# Patient Record
(demographics unavailable — no encounter records)

---

## 2024-10-30 NOTE — HISTORY OF PRESENT ILLNESS
[FreeTextEntry1] : THIS PLEASANT PATIENT IS 68 year OLD  male  WHO PRESENT TODAY IN OFFICE WITH LSPINE RADATING TO THE RT HIP DWN TO THE KNEE    DATE OF INJURY/ONSET  THE PAST 6 MTH      PAIN LEVEL: 7/10     MECHANISM OF INJURY: UNKOWN INJURAY       QUALITY OF SYMPTOMS:  DULL/ACHE, SHOOTING      IMPROVES WITH:  NOTHING     WORSE WITH:  GAIT, BALANCE      PRIOR TREATMENT:  AS OF 05/10/2024 PATIENT IS PRESENTING WITH ACUTE/SUB-ACUTE RADICULAR PAIN WITH IMPAIRMENT IN ADLs AND FUNCTIONALITY. THE PATIENT HAS NOT RESPONDED TO CONSERVATIVE CARE INCLUDING NSAID THERAPY AND/OR PHYSICAL THERAPY 2-3X A WEEK FOR 6 WEEK      PRIOR IMAGING:  NO RECENT IMAGED        SCHOOL/SPORT/POSITION/OCCUPATION:       ADDITIONAL INFORMATION

## 2024-10-30 NOTE — ASSESSMENT
[FreeTextEntry1] : Subjective findings This 68-year-old male presents with pain in the back with a minimal radiating component down the leg and the pain is all on the right side.  Patient's history is that he underwent a knee repair on his left side and after that started developing this pain.  He has had sciatica in the past has been treated with epidural steroid injections but he states that this pain feels different.  He denies any bowel or bladder incontinence any progressive weakness but presents to us for our evaluation for possible interventional methods to treat this pain.The CV/Pulm/GI/Heme/Renal/Hepatic//Psych/Endo systems are reviewed. A full ROS was performed and reviewed with the patient today, see intake form.  Average pain score for the month is ?? out of ten. The patient's current medications are documented to the best of their ability. The patient has failed conservative therapies to include medical management, and physical activity to treat the pain. The patient has decreased function secondary to the pain. Objective findings There are no recent imaging studies of the lumbar spine.  Patient is able to get to a standing position without assistance.  He has decreased rotational motion of his lumbar spine.  He describes the pain in the paraspinous area at approximately the L4-L5 levels. Assessment Lumbar facet arthropathy versus lumbar radiculopathy Plan Prior to any intervention the patient will obtain the imaging study of the lumbar spine to try to elucidate the exact etiology of the pain.  Spoke to patient about facet joint injections. Explained risks, benefits and alternatives including but not limited to the risk of infection, bleeding, headache, syncopal episode, failure to resolve issues, allergic reaction, symptom recurrence, allergic reaction, nerve injury, and increased pain. The patient understands the risks. All questions were answered. The patient is willing to proceed. The importance of increasing exercise after the injection is also stressed. The use of the injection as a jump start so that the patient can do more exercise, but that the exercise is the long-term answer for the patient is reviewed. The patient states understanding.  This note was generated by using Dragon medical dictation software.  A reasonable effort has been made for proofreading its contents, but typos may still remain.  If there are any questions or points of clarification needed, please notify my office.

## 2024-11-06 NOTE — HISTORY OF PRESENT ILLNESS
[Home] : at home, [unfilled] , at the time of the visit. [Medical Office: ([facility])___] : at the medical office located in  [Verbal consent obtained from patient] : the patient, [unfilled] [FreeTextEntry1] : SHANNON POLLACK IS FOLLOWING UP FOR MRI RESULT ON 10.30.2024 OF LSPINE RADATING TO THE RT HIP DWN TO THE KNEE PAIN  :   DATE OF INJURY/ONSET  THE PAST 6 MTH      PAIN LEVEL: 7/10     MECHANISM OF INJURY: UNKOWN INJURAY       QUALITY OF SYMPTOMS:  DULL/ACHE, SHOOTING      IMPROVES WITH:  NOTHING     WORSE WITH:  GAIT, BALANCE      PRIOR TREATMENT:  AS OF 05/10/2024 PATIENT IS PRESENTING WITH ACUTE/SUB-ACUTE RADICULAR PAIN WITH IMPAIRMENT IN ADLs AND FUNCTIONALITY. THE PATIENT HAS NOT RESPONDED TO CONSERVATIVE CARE INCLUDING NSAID THERAPY AND/OR PHYSICAL THERAPY 2-3X A WEEK FOR 6 WEEK      PRIOR IMAGING:  NO RECENT IMAGED        SCHOOL/SPORT/POSITION/OCCUPATION:       ADDITIONAL INFORMATION

## 2024-11-19 NOTE — ASSESSMENT
[FreeTextEntry1] : Interim history Patient contact the office after an epidural steroid injection.  He states that the radicular component of his pain has resolved with the injection but he is left with a searing lower back pain on the right side.  Patient says that this pain was present before the injection but was not resolved with the epidural steroid injection.  Patient claims that rotation of the lower back does aggravate the pain.  He is unable to function at a level that he would like secondary to this pain.  Patient denies any bowel or bladder incontinence or any progressive weakness. Objective There are no new imaging studies.  Patient is observed rotating his lower spine and causing reproduction of the pain.  Patient is able to get to a standing position without assistance. Assessment Resolved radiculopathy and now facet arthropathy Plan I would like to perform diagnostic facet joint injections of the right L3-4 L4-5 L5-S1 levels.  The patient has not had facet joint injections in the past.  To the best of my ability I feel that the source of this patient's ongoing pain is the facet joint and injections in the facet joints should reduce the discomfort.  If these are successful we will consider facet joint rhizotomies.  Patient has failed conservative measures to include physician directed activity and medical management prior to his initial visit with us.

## 2024-11-19 NOTE — HISTORY OF PRESENT ILLNESS
[Lower back] : lower back [6] : 6 [3] : 3 [Burning] : burning [Constant] : constant [Rest] : rest [Walking] : walking [Retired] : Work status: retired [Home] : at home, [unfilled] , at the time of the visit. [Medical Office: ([facility])___] : at the medical office located in  [Verbal consent obtained from patient] : the patient, [unfilled] [] : no [FreeTextEntry6] : JOSE [de-identified] : CURRENT [de-identified] : 11.11.24 [TWNoteComboBox1] : 50%

## 2024-12-04 NOTE — HISTORY OF PRESENT ILLNESS
[Home] : at home, [unfilled] , at the time of the visit. [Medical Office: ([facility])___] : at the medical office located in  [Verbal consent obtained from patient] : the patient, [unfilled] [FreeTextEntry1] : SHANNON POLLACK IS FOLLOWING UP FOR CSPINE JUMPING TO THE RT THUMB  :   DATE OF INJURY/ONSET  THE PAST 6 MTH      PAIN LEVEL: 7/10     MECHANISM OF INJURY: UNKOWN INJURAY       QUALITY OF SYMPTOMS:  DULL/ACHE, SHOOTING      IMPROVES WITH:  NOTHING     WORSE WITH:  MOVING AROUND THE HEAD      PRIOR TREATMENT:  AS OF 05/10/2024 FOR 3MNTH  PATIENT IS PRESENTING WITH ACUTE/SUB-ACUTE RADICULAR PAIN WITH IMPAIRMENT IN ADLs AND FUNCTIONALITY. THE PATIENT HAS NOT RESPONDED TO CONSERVATIVE CARE INCLUDING NSAID THERAPY AND/OR PHYSICAL THERAPY 2-3X A WEEK FOR 6 WEEK      PRIOR IMAGING:  NO RECENT IMAGED        SCHOOL/SPORT/POSITION/OCCUPATION:       ADDITIONAL INFORMATION

## 2024-12-04 NOTE — ASSESSMENT
[FreeTextEntry1] : Interim history The patient returns with pain that has been treated adequately in the past with epidural steroid injections.  The patient's complaints are consistent with radiculopathy. Patient's ADL's increase with prior HIRAM.   The last injection gave greater than 60% reduction of the pain but now there is a return of symptoms. The patient is requesting a subsequent epidural steroid injection to alleviate pain and improve functional ability and quality of life.  Patient is a candidate. Objective findings Since the last visit, there have been no new imaging studies. The patient has no new symptoms except the return of the their pain complaints. Motor and sensory function is unchanged. Plan Prior to any procedure will obtain new MRI of the cervical spine.  It has been more than 3 years since the last MRI of the cervical spine.  Spoke to patient about epidural steroid injections. Explained risks, benefits and alternatives including but not limited to the risk of infection, bleeding, headache, syncopal episode, failure to resolve issues, allergic reaction, symptom recurrence, allergic reaction, nerve injury, and increased pain. The patient understands the risks. All questions were answered. The patient is willing to proceed.  This note was generated by using Dragon medical dictation software.  A reasonable effort has been made for proofreading its contents, but typos may still remain.  If there are any questions or points of clarification needed, please notify my office.

## 2024-12-11 NOTE — HISTORY OF PRESENT ILLNESS
[FreeTextEntry1] : SHANNON POLLACK IS FOLLOWING UP FOR CSPINE JUMPING TO THE RT THUMB WOULD LIKE TO DISS MRI  :   DATE OF INJURY/ONSET  THE PAST 6 MTH,      PAIN LEVEL: 7/10     MECHANISM OF INJURY: UNKOWN INJURAY       QUALITY OF SYMPTOMS:  DULL/ACHE, SHOOTING      IMPROVES WITH:  NOTHING     WORSE WITH:  MOVING AROUND THE HEAD      PRIOR TREATMENT:  AS OF 05/10/2024 FOR 3MNTH  PATIENT IS PRESENTING WITH ACUTE/SUB-ACUTE RADICULAR PAIN WITH IMPAIRMENT IN ADLs AND FUNCTIONALITY. THE PATIENT HAS NOT RESPONDED TO CONSERVATIVE CARE INCLUDING NSAID THERAPY AND/OR PHYSICAL THERAPY 2-3X A WEEK FOR 6 WEEK      PRIOR IMAGING:  NO RECENT IMAGED        SCHOOL/SPORT/POSITION/OCCUPATION:       ADDITIONAL INFORMATION

## 2024-12-11 NOTE — ASSESSMENT
[FreeTextEntry1] : Interim history Patient returns the office with continued pain in the neck with a radiating component down the arms.  We have obtained an MRI of the cervical spine.  In addition to the arthritic changes that we expected to find the patient also was thought to have myelopathy.  Patient denies any weakness or any loss of control of bowel or bladder at this time. Objective MRI as discussed above.  Patient says that this no change in his physical status. Assessment Cervical radiculopathy with myelopathy Plan Patient is a candidate for epidural steroid injections but we will wait for the workup of the myelopathy before proceeding.  This is neurologist given to the patient for follow-up and the patient will follow-up with us after the neurologic evaluation.

## 2025-01-09 NOTE — CONSULT LETTER
[Dear  ___] : Dear  [unfilled], [Courtesy Letter:] : I had the pleasure of seeing your patient, [unfilled], in my office today. [Sincerely,] : Sincerely, [FreeTextEntry1] : Mr. Morgan is a very pleasant 68-year-old male patient who was seen in our office today regarding cervical spine imaging findings as well as right-sided hip pain.  The patient's primary concern by far is his right sided hip pain.  The patient endorses this pain starting shortly after revision surgery of his left knee performed in September 2024.  His left knee, however, does not cause any pain whatsoever at this time.  The patient's pain is primarily localized to the right hip and does not radiate.  There is no associated numbness or tingling.  The patient's pain is worse with certain movements particularly with walking several minutes or taking stairs.  The patient's pain is intermittent and resolves with rest.  The patient endorses significant improvement of his pain with lying in a recliner.  The patient has attempted injections in the low back without significant or lasting relief.  In addition, the patient also endorses intermittent numbness radiating from the neck into the thumbs bilaterally.  The patient notices the symptoms primarily when swimming.  The symptoms resolve spontaneously as well.  The patient endorses a remote history of prostate cancer.  The patient's current medical regimen includes tadalafil and baby aspirin regularly.  The patient denies allergies to medications.  The patient endorses a left knee replacement in 2007 and a left knee revision on September 4, 2024.  On examination, the patient is alert, oriented, and compliant with the exam.  The patient demonstrates full strength in the upper and lower extremities bilaterally.  The patient does not demonstrate any reflexes in the lower extremities.  The patient does not have a Bobby sign or ankle clonus.  The patient stands with a slightly stooped posture and ambulates with an antalgic gait.  The patient endorses pain with range of motion of the right hip.  This pain is worse with flexion and extension of the hip and still worse with abduction of the hip though to a lesser degree.  The patient is accompanied with an MRI scan of the cervical spine dated December 9, 2024.  These images demonstrate a focus of T2 signal change in the spinal cord behind the body of C4.  However, there does not appear to be any ongoing compression of the spinal cord at this level.  Moderate central stenosis is noted at C3/4 and C6/7.  Foraminal stenosis is noted throughout the cervical spine to varying degrees with severe foraminal stenosis bilaterally at C3/4, on the left at C4/5, bilaterally at C5/6, and bilaterally at C6/7.  The patient is also accompanied with an MRI scan of the lumbar spine dated October 30, 2024.  These images demonstrated multilevel degenerative changes with foraminal stenosis most notably at L4/5 on the right possibly causing nerve root compression.  There is no significant lateral recess stenosis or central stenosis.  These images also suggest the possibility of a scoliotic deformity but this was not completely imaged.  Taken together, the patient has a clinical history and radiographic findings most consistent with musculoskeletal causes for right-sided hip pain.  By history, this new right-sided hip pain is likely secondary to compensation for alterations of his left knee.  I have recommended scoliosis and leg length x-rays to assess his sagittal and coronal balance as well as to rule out a leg length discrepancy.  The patient has also been recommended a right hip MRI scan to rule out a soft tissue injury or concern.  The patient's cervical spine imaging demonstrates myelomalacia within the cervical cord but, without any compressive lesion, I explained that there is no surgical option for him based on these images alone.  The patient has been recommended flexion/extension cervical MRI scans to rule out dynamic instability or dynamic compression of the cervical cord.  The patient's radiating numbness in the thumbs is more likely a result of foraminal stenosis at C5/6 which could be amenable to an anterior cervical decompression and fusion or posterior cervical foraminotomies should his symptoms worsen.  At this time, the patient has been recommended some activity restrictions with regards to his cervical spine until his updated images are complete and reviewed with him. [FreeTextEntry3] : Alpesh Morse MD, PhD, FRCPSC Attending Neurosurgeon [address] [place] [address] Office: [phone] Fax: [phone]

## 2025-01-09 NOTE — CONSULT LETTER
[Dear  ___] : Dear  [unfilled], [Courtesy Letter:] : I had the pleasure of seeing your patient, [unfilled], in my office today. [Sincerely,] : Sincerely, [FreeTextEntry1] : Mr. Morgan is a very pleasant 68-year-old male patient who was seen in our office today regarding cervical spine imaging findings as well as right-sided hip pain.  The patient's primary concern by far is his right sided hip pain.  The patient endorses this pain starting shortly after revision surgery of his left knee performed in September 2024.  His left knee, however, does not cause any pain whatsoever at this time.  The patient's pain is primarily localized to the right hip and does not radiate.  There is no associated numbness or tingling.  The patient's pain is worse with certain movements particularly with walking several minutes or taking stairs.  The patient's pain is intermittent and resolves with rest.  The patient endorses significant improvement of his pain with lying in a recliner.  The patient has attempted injections in the low back without significant or lasting relief.  In addition, the patient also endorses intermittent numbness radiating from the neck into the thumbs bilaterally.  The patient notices the symptoms primarily when swimming.  The symptoms resolve spontaneously as well.  The patient endorses a remote history of prostate cancer.  The patient's current medical regimen includes tadalafil and baby aspirin regularly.  The patient denies allergies to medications.  The patient endorses a left knee replacement in 2007 and a left knee revision on September 4, 2024.  On examination, the patient is alert, oriented, and compliant with the exam.  The patient demonstrates full strength in the upper and lower extremities bilaterally.  The patient does not demonstrate any reflexes in the lower extremities.  The patient does not have a Bobby sign or ankle clonus.  The patient stands with a slightly stooped posture and ambulates with an antalgic gait.  The patient endorses pain with range of motion of the right hip.  This pain is worse with flexion and extension of the hip and still worse with abduction of the hip though to a lesser degree.  The patient is accompanied with an MRI scan of the cervical spine dated December 9, 2024.  These images demonstrate a focus of T2 signal change in the spinal cord behind the body of C4.  However, there does not appear to be any ongoing compression of the spinal cord at this level.  Moderate central stenosis is noted at C3/4 and C6/7.  Foraminal stenosis is noted throughout the cervical spine to varying degrees with severe foraminal stenosis bilaterally at C3/4, on the left at C4/5, bilaterally at C5/6, and bilaterally at C6/7.  The patient is also accompanied with an MRI scan of the lumbar spine dated October 30, 2024.  These images demonstrated multilevel degenerative changes with foraminal stenosis most notably at L4/5 on the right possibly causing nerve root compression.  There is no significant lateral recess stenosis or central stenosis.  These images also suggest the possibility of a scoliotic deformity but this was not completely imaged.  Taken together, the patient has a clinical history and radiographic findings most consistent with musculoskeletal causes for right-sided hip pain.  By history, this new right-sided hip pain is likely secondary to compensation for alterations of his left knee.  I have recommended scoliosis and leg length x-rays to assess his sagittal and coronal balance as well as to rule out a leg length discrepancy.  The patient has also been recommended a right hip MRI scan to rule out a soft tissue injury or concern.  The patient's cervical spine imaging demonstrates myelomalacia within the cervical cord but, without any compressive lesion, I explained that there is no surgical option for him based on these images alone.  The patient has been recommended flexion/extension cervical MRI scans to rule out dynamic instability or dynamic compression of the cervical cord.  The patient's radiating numbness in the thumbs is more likely a result of foraminal stenosis at C5/6 which could be amenable to an anterior cervical decompression and fusion or posterior cervical foraminotomies should his symptoms worsen.  At this time, the patient has been recommended some activity restrictions with regards to his cervical spine until his updated images are complete and reviewed with him. [FreeTextEntry3] : Alpesh Morse MD, PhD, FRCPSC Attending Neurosurgeon [address] [place] [address] Office: [phone] Fax: [phone]

## 2025-02-18 NOTE — ADDENDUM
[FreeTextEntry1] : I, Vik Vazquez, acted solely as a scribe for Dr. Rebel Johansen on 02/18/2025 . All medical entries made by the Scribe were at my, Dr. Rebel Johansen's, direction and personally dictated by me on 02/18/2025 . I have reviewed the chart and agree that the record accurately reflects my personal performance of the history, physical exam, assessment and plan. I have also personally directed, reviewed, and agreed with the chart.

## 2025-02-18 NOTE — PHYSICAL EXAM
[Restricted in physically strenuous activity but ambulatory and able to carry out work of a light or sedentary nature] : Status 1- Restricted in physically strenuous activity but ambulatory and able to carry out work of a light or sedentary nature, e.g., light house work, office work [Normal] : affect appropriate [de-identified] : Bilateral varicose veins around ankles, L > R. No edema, cords, tenderness. [de-identified] : Shingles scar left lower flank

## 2025-02-18 NOTE — CONSULT LETTER
[Dear  ___] : Dear  [unfilled], [Courtesy Letter:] : I had the pleasure of seeing your patient, [unfilled], in my office today. [Please see my note below.] : Please see my note below. [Sincerely,] : Sincerely, [DrJose Manuel  ___] : Dr. CAZARES [DrJose Manuel ___] : Dr. CAZARES [___] : [unfilled] [FreeTextEntry2] : Dr. Peewee Shelton\par   [FreeTextEntry3] : Rebel Johansen M.D., FACP\par  Professor of Medicine\par  [employer]\par  Associate Chief, Division of Hematology\par  [facility]\par  [facility]\par  [address]\[place] [address]\par  [phone]\par  \par  \par  \par  \par

## 2025-02-18 NOTE — RESULTS/DATA
[FreeTextEntry1] : WBC 6,830 Hgb 14.1 Hct 43.8 MCV 91.3 Platelets 314,000 Diff 81P 11L 4M 1Imm gran 1Eo 2Ba ANC 5,490  1/10/25 MRI Right hip impression: 1. diffusely heterogeneous bone marrow signal compatible with anemia or marrow infiltrative disorder. Correlate clinically.  2. tear of the anterior superior labrum.   8/07/24 PT 12.4  INR 1.10  APTT 35.0

## 2025-02-18 NOTE — REVIEW OF SYSTEMS
[Lower Ext Edema] : lower extremity edema [Joint Pain] : joint pain [Negative] : Allergic/Immunologic [Recent Change In Weight] : ~T recent weight change [FreeTextEntry5] : left foot swelling

## 2025-02-18 NOTE — ASSESSMENT
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  Plan: Hold phlebotomy today ASA 81 mg daily  Phlebotomize to keep hgb < 15 RSV vaccine CBC monthly RTC 3 months

## 2025-02-18 NOTE — HISTORY OF PRESENT ILLNESS
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 Underwent revision left total knee replacement in September. Still undergoing rehab for the knee.  Reports chronic low back pain that is worse when he walks. Reports he was found to have a right hip labral tear upon MRI.  Also reports neck pain. Chronic left foot swelling persists. Notes neuropathy in his feet. He notes no bleeding bruising, headaches, visual problems, palpitations, abdominal pain, leg pain, other skeletal pain, pruritus, neurologic events. Has lost 10 lbs since the surgery. Had flu shot. Declines COVID booster.

## 2025-03-05 NOTE — CONSULT LETTER
[Dear  ___] : Dear  [unfilled], [Courtesy Letter:] : I had the pleasure of seeing your patient, [unfilled], in my office today. [Sincerely,] : Sincerely, [FreeTextEntry1] : Mr. Morgan is a very pleasant 69-year-old male patient who was seen in our office today to review imaging findings in the context of right-sided hip pain.  I am happy to report that the patient has currently started physical therapy for both his left knee replacement and his right hip with good results.  The patient unfortunately did start experiencing severe pain in the big toe on both sides which lasted several days before resolving spontaneously.  The patient has also started experiencing pain and numbness at the soles of his feet bilaterally.  On examination, the patient remains alert, oriented, and compliant with the exam.  The patient continues to demonstrate full strength in the upper and lower extremities bilaterally.  The patient does not have a Bobby sign or ankle clonus.  The patient is accompanied with scoliosis x-rays performed on January 9, 2025.  These images demonstrated a mild graded scoliotic deformity likely degenerative in nature without a significant sagittal or coronal imbalance.  No leg length x-rays were performed.  The patient is also accompanied with flexion/extension MRI scans of the cervical spine performed on January 10, 2024 no ongoing compression was noted with extension.  The patient is accompanied with MRI scan of right hip which demonstrated cartilage loss over the posterior aspect of the femoral head with small subchondral cysts and a tear of the anterior superior labrum.  Taken together, I am gratified see the patient doing well with conservative therapies.  At this time, I went over the significance of the patient's imaging findings with him.  I explained to the patient that there does not appear to be any ongoing compression of the cervical cord despite the presence of myelomalacia on imaging.  Moreover, the patient is not grossly myelopathic and thus I have recommended a wait-and-see approach for this finding.  The patient has been informed that he is a candidate for an anterior cervical decompression and fusion at C5/6 for persistent radiating symptoms into his thumbs but the patient endorses the symptoms are very mild.  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  The patient additionally has a degenerative scoliosis without significant sagittal or coronal imbalance.  The patient has been recommended physical therapy with specific emphasis on core strengthening and postural training for these issues. The patient will be referred to a podiatrist for evaluation of his lower extremity symptoms which potentially represent gout and/or plantar fasciitis.  The patient will be following up with our office on an as-needed basis. [FreeTextEntry3] :  Alpesh Morse MD, PhD, FRCPSC Attending Neurosurgeon [address], [address] [place] [address] Office: [phone] Fax: [phone]

## 2025-03-12 NOTE — HISTORY OF PRESENT ILLNESS
[9] : 9 [3] : 3 [Burning] : burning [Constant] : constant [Sleep] : sleep [Retired] : Work status: retired [Home] : at home, [unfilled] , at the time of the visit. [Medical Office: ([facility])___] : at the medical office located in  [Telehealth (audio & video)] : This visit was provided via telehealth using real-time 2-way audio visual technology. 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

## 2025-03-12 NOTE — ASSESSMENT
[FreeTextEntry1] : Interim history The patient returns with pain that has been treated adequately in the past with epidural steroid injections.  The patient has had neurologic and neurosurgical workup and has been cleared for an epidural steroid injection.  The patient's complaints are consistent with radiculopathy. Patient's ADL's increase with prior HIRAM.   The last injection gave greater than 60% reduction of the pain but now there is a return of symptoms. The patient is requesting a subsequent epidural steroid injection to alleviate pain and improve functional ability and quality of life.  Patient is a candidate. Objective findings Since the last visit, there have been no new imaging studies. The patient has no new symptoms except the return of the their pain complaints. Motor and sensory function is unchanged. Plan Spoke to patient about epidural steroid injections. Explained risks, benefits and alternatives including but not limited to the risk of infection, bleeding, headache, syncopal episode, failure to resolve issues, allergic reaction, symptom recurrence, allergic reaction, nerve injury, and increased pain. The patient understands the risks. All questions were answered. The patient is willing to proceed.  This note was generated by using Dragon medical dictation software.  A reasonable effort has been made for proofreading its contents, but typos may still remain.  If there are any questions or points of clarification needed, please notify my office.

## 2025-04-15 NOTE — HISTORY OF PRESENT ILLNESS
[FreeTextEntry1] : 7-2024 Perioperative Recommendations (and cardiac clearance) for SHANNON POLLACK for knee revision procedure  - EKG without ischemic changes - Patient is asymptomatic and stable from a cardiac standpoint.  - SHANNON requires no further cardiac testing prior to procedure and may proceed from cardiac standpoint. - Can remain off ASA for 1 wk  Derrick Conde MD   63 Year Old Gentleman (Wife, Citlalli, Also A Patient Of Mine) - Works In [employer] And Very Fatigued/Reports Job Burnout (3 Hour Commute) - History Of - Polycythemia Vera Undergoing Intermittent Phlebotomy And Followed Closely By Alonso Johansen, Ischemic Stroke, Symptomatic Atrial Fibrillation [Dx: In 2010, Initially Treated With Sotalol/Dofetilide, DCCV, And Then Segmental PVI Ablation In 2011 Procedure Unfortunately C/b Ischemic Stroke With Good Recovery then DCCV and Ablation and Ablatiopn in Jan 2020 and reportedly asymptomatic since,] - Presenting For Cardiovascular Evaluation ============ ============ ============ 07/2019 Patient had an episode of Atrial Fibrillation in 06/2019; Took Metoprolol. Resolved. Evaluated By Dr. Rodas and advised to follow with general cardiology.  Reports more fatigue with work, has a 3 hour commute, wakes up several times at night. Bloodwork including TSH normal.  Underwent an ETT - 2019 - No ischemic changes. Echocardiogram: March 27, 2017: Normal left ventricular size and function with an ejection fraction of 60%. There was mild tricuspid regurgitation. =============== 11/2019 No Recurrent AF. Holter-7-2019 - Ocasional ectopic beats with triggering Holding off on treating.  -----------------------  Slightly elevated blood pressure.  We discussed that related to streses at work  and lack of exercise/sleep Try this  --------------------------  For every day ASA Long discussion ensued regarding his desire to be OFF Eliquis for AFib Stroke Prevention.  I expressed my concern given his prior stroke, which he was quick to point out was related to Polycythemia. I told him that, I believed there is greater downside - specifically the risk of stroke - from being off of eliquis than the inconvenience of taking the medication. He understood the downside, but wished to come off of it. I asked him to consider an ILR for 4 months prior to making the decision, which he was reluctant to do.  We eventually compromised on a Ziopatch for 2 weeks - if no AF, based on his wishes, he will come off Eliquis  -----------------  Ziopatch - 07- - No AF  Just gave notice at work :). ----------------- -----------------  New job. [place] commute ----------------- -----------------  Going for another biopsy Coming off ASA  ----------------- ----------------- TTE -  - LVEF  Nl; Mild gto Mod MR; Mild to Mod TR; Monitor likely AF; But Biatrial - monitor  ----------------- -----------------  Check echo next appt  TTE -- 7-2024 - LVEF  Nl; Mild MR ----------------- -----------------  Orthostatic    1-2023

## 2025-04-15 NOTE — DISCUSSION/SUMMARY
[FreeTextEntry1] : 63 Year Old Gentleman (Wife, Citlalli, Also A Patient Of Mine) - Works In [employer] And Very Fatigued/Reports Job Burnout (3 Hour Commute) - History Of - Polycythemia Vera Undergoing Intermittent Phlebotomy And Followed Closely By Alonso Johansen, Ischemic Stroke, Symptomatic Atrial Fibrillation [Dx: In 2010, Initially Treated With Sotalol/Dofetilide, DCCV, And Then Segmental PVI Ablation In 2011 Procedure Unfortunately C/b Ischemic Stroke With Good Recovery then DCCV and Ablation and Ablatiopn in Jan 2020 and reportedly asymptomatic since,] On Eliquis (But wishing to come off of it) ============ ============ TTE -  - LVEF  Nl; Mild gto Mod MR; Mild to Mod TR; Monitor likely AF; But Biatrial - monitor  Ziopatch - 07- - No AF  Holter-7-2019 - Ocasional ectopic beats with triggering ETT - 2019 - Normal TTE - 2017 - Normal, Mild TR, LAE ============ Infrequent Symptomatic AF & AC/Polycyth Vera For every day ASA Long discussion ensued regarding his desire to be OFF Eliquis for AFib Stroke Prevention.  Repet Event monitor with no AF for 2 wks  - Discussed role of lifestyle changes especially to reduce stress to reduce AF triggers - Continue ASA for PVrs  HTN - Treat wit lifestyle/office

## 2025-04-15 NOTE — PHYSICAL EXAM
[General Appearance - Well Developed] : well developed [Normal Appearance] : normal appearance [Well Groomed] : well groomed [General Appearance - Well Nourished] : well nourished [No Deformities] : no deformities [General Appearance - In No Acute Distress] : no acute distress [Eyelids - No Xanthelasma] : the eyelids demonstrated no xanthelasmas [Normal Oral Mucosa] : normal oral mucosa [No Oral Pallor] : no oral pallor [No Oral Cyanosis] : no oral cyanosis [Normal Jugular Venous A Waves Present] : normal jugular venous A waves present [Normal Jugular Venous V Waves Present] : normal jugular venous V waves present [No Jugular Venous Dumont A Waves] : no jugular venous dumont A waves [Respiration, Rhythm And Depth] : normal respiratory rhythm and effort [Exaggerated Use Of Accessory Muscles For Inspiration] : no accessory muscle use [Auscultation Breath Sounds / Voice Sounds] : lungs were clear to auscultation bilaterally [Heart Rate And Rhythm] : heart rate and rhythm were normal [Heart Sounds] : normal S1 and S2 [Murmurs] : no murmurs present [Abdomen Soft] : soft [Abdomen Tenderness] : non-tender [Abdomen Mass (___ Cm)] : no abdominal mass palpated [Abnormal Walk] : normal gait [Gait - Sufficient For Exercise Testing] : the gait was sufficient for exercise testing [Nail Clubbing] : no clubbing of the fingernails [Cyanosis, Localized] : no localized cyanosis [Petechial Hemorrhages (___cm)] : no petechial hemorrhages [Skin Color & Pigmentation] : normal skin color and pigmentation [] : no rash [No Venous Stasis] : no venous stasis [Skin Lesions] : no skin lesions [No Skin Ulcers] : no skin ulcer [No Xanthoma] : no  xanthoma was observed [Oriented To Time, Place, And Person] : oriented to person, place, and time [Affect] : the affect was normal [Mood] : the mood was normal [No Anxiety] : not feeling anxious [Well Developed] : well developed [Well Nourished] : well nourished [No Acute Distress] : no acute distress [Normal Conjunctiva] : normal conjunctiva [Normal Venous Pressure] : normal venous pressure [No Carotid Bruit] : no carotid bruit [Normal S1, S2] : normal S1, S2 [No Murmur] : no murmur [No Rub] : no rub [No Gallop] : no gallop [Clear Lung Fields] : clear lung fields [Good Air Entry] : good air entry [No Respiratory Distress] : no respiratory distress  [Soft] : abdomen soft [Non Tender] : non-tender [No Masses/organomegaly] : no masses/organomegaly [Normal Bowel Sounds] : normal bowel sounds [Normal Gait] : normal gait [No Edema] : no edema [No Cyanosis] : no cyanosis [No Clubbing] : no clubbing [No Varicosities] : no varicosities [No Rash] : no rash [No Skin Lesions] : no skin lesions [Moves all extremities] : moves all extremities [No Focal Deficits] : no focal deficits [Normal Speech] : normal speech [Alert and Oriented] : alert and oriented [Normal memory] : normal memory

## 2025-05-27 NOTE — RESULTS/DATA
[FreeTextEntry1] : WBC 5120 Hgb 13.4 Hct 41.2 MCV 94.5 Platelets 249,000 Diff 79P 12L 5M 1Imm gran 2Eo 2Ba ANC 4030

## 2025-05-27 NOTE — ASSESSMENT
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  Plan: Hold phlebotomy today ASA 81 mg daily  Phlebotomize to keep hgb < 15 Reviewed precautions re: physical activities with splenomegaly RSV vaccine CBC monthly RTC 3 months   Detail Level: Zone

## 2025-05-27 NOTE — ADDENDUM
[FreeTextEntry1] : Documented by Marta Diaz acting as a scribe for Dr. Rebel Johansen on 05/27/2025. All medical record entries made by the Scribe were at my, Dr. Rebel Johansen, direction and personally dictated by me on 05/27/2025. I have reviewed the chart and agree that the record accurately reflects my personal performance of the history, physical exam, assessment and plan. I have also personally directed, reviewed, and agree with the discharge instructions.

## 2025-05-27 NOTE — CONSULT LETTER
[Dear  ___] : Dear  [unfilled], [Courtesy Letter:] : I had the pleasure of seeing your patient, [unfilled], in my office today. [Please see my note below.] : Please see my note below. [Sincerely,] : Sincerely, [DrJose Manuel  ___] : Dr. CAZARES [DrJose Manuel ___] : Dr. CAZARES [___] : [unfilled] [FreeTextEntry2] : Dr. Peewee Shelton\par   [FreeTextEntry3] : Rebel Johansen M.D., FACP\par  Professor of Medicine\par  [employer]\par  Associate Chief, Division of Hematology\par  [facility]\par  [facility]\par  [address]\[place] [address]\par  [phone]\par  \par  \par  \par  \par

## 2025-05-27 NOTE — REVIEW OF SYSTEMS
[Lower Ext Edema] : lower extremity edema [Joint Pain] : joint pain [Negative] : Constitutional [FreeTextEntry5] : left foot swelling  [de-identified] : Neuropathy

## 2025-05-27 NOTE — HISTORY OF PRESENT ILLNESS
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 Reports chronic low back pain is gradually improving. Neck pain is improving. Chronic left foot swelling persists. Notes stable neuropathy in his feet. He notes no bleeding, bruising, headaches, visual problems, palpitations, chest pain, SOB, abdominal pain, leg pain, other skeletal pain, pruritus, neurologic events, pain in the feet. Weight is stable. Had flu shot. Declines COVID booster. Is hesitant about the RSV vaccine at this time.

## 2025-05-27 NOTE — PHYSICAL EXAM
[Restricted in physically strenuous activity but ambulatory and able to carry out work of a light or sedentary nature] : Status 1- Restricted in physically strenuous activity but ambulatory and able to carry out work of a light or sedentary nature, e.g., light house work, office work [Normal] : affect appropriate [de-identified] : Bilateral varicose veins around ankles, L > R. No edema, cords, tenderness. [de-identified] : BS normal. Soft, nontender. Spleen 5 cm below LCM-AAL. No hepatomegaly, masses. [de-identified] : Shingles scar left lower flank

## 2025-06-06 NOTE — ASSESSMENT
[FreeTextEntry1] : Interim history The patient returns with pain that has been treated adequately in the past with epidural steroid injections.  The patient's complaints are consistent with radiculopathy. Patient's ADL's increase with prior HIRAM.   The last injection gave greater than 60% reduction of the pain but now there is a return of symptoms. The patient is requesting a subsequent epidural steroid injection to alleviate pain and improve functional ability and quality of life.  Patient is a candidate. Objective findings Since the last visit, there have been no new imaging studies. The patient has no new symptoms except the return of the their pain complaints. Motor and sensory function is unchanged. Plan Spoke to patient about epidural steroid injections. Explained risks, benefits and alternatives including but not limited to the risk of infection, bleeding, headache, syncopal episode, failure to resolve issues, allergic reaction, symptom recurrence, allergic reaction, nerve injury, and increased pain. The patient understands the risks. All questions were answered. The patient is willing to proceed.  This note was generated by using Dragon medical dictation software.  A reasonable effort has been made for proofreading its contents, but typos may still remain.  If there are any questions or points of clarification needed, please notify my office.

## 2025-06-06 NOTE — HISTORY OF PRESENT ILLNESS
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